# Patient Record
(demographics unavailable — no encounter records)

---

## 2020-01-17 NOTE — EDM.PDOC
ED HPI GENERAL MEDICAL PROBLEM





- General


Chief Complaint: Gastrointestinal Problem


Stated Complaint: VOMITING/CHILLS


Time Seen by Provider: 01/17/20 03:00


Source of Information: Reports: Patient


History Limitations: Reports: No Limitations





- History of Present Illness


INITIAL COMMENTS - FREE TEXT/NARRATIVE: 





34-year-old male presents to the ED with his wife with a similar type illness. 

He started to feel unwell evening before last while trying to do his workout at 

the gym he thought he just had no energy and started to feel unwell. Throughout 

yesterday he started to feel increasing nausea and when he came home last night 

he began vomiting bilious material. These had 3 major emesis is overnight every 

time he tries to put anything in it comes right back up. He's developed 

growling and increased cramping in his abdomen but no diarrhea. At some fever 

and some chills. His wife seemed to be suffering from acute viral 

gastroenteritis versus foodborne illness. He's feeling weak and slightly dizzy 

with standing. Denies any high fever or nasal congestion or cough. Perhaps very 

mild headache.


Onset: Gradual


Onset Date: 01/15/20 (I do feel a little unwell during his workout on the 

evening of the 15th and became worse yesterday.)


Duration: Hour(s):, Getting Worse


Location: Reports: Abdomen (Nausea and vomiting. No diarrhea)


Quality: Reports: Ache, Sharp (Mild intermittent abdominal cramping pain), 

Stabbing


Severity: Mild


Improves with: Reports: None


Worsens with: Reports: Other


Context: Reports: Sick Contact.  Denies: Activity, Exercise (Aching any fluids 

makes him vomit right away), Lifting, Trauma, Other (GM or through the 

workplace.)


Associated Symptoms: Reports: Fever/Chills, Loss of Appetite, Malaise, Nausea/

Vomiting, Other (No diarrhea).  Denies: No Other Symptoms, Confusion, Chest Pain

, Cough, cough w sputum, Diaphoresis, Headaches, Rash, Seizure, Shortness of 

Breath, Syncope (Bilious emesis.)


Treatments PTA: Reports: Other (see below) (Nothing will stay down)





- Related Data


 Allergies











Allergy/AdvReac Type Severity Reaction Status Date / Time


 


No Known Allergies Allergy   Verified 06/07/18 16:24











Home Meds: 


 Home Meds





FLUoxetine HCl [Prozac] 40 mg PO DAILY 20 Days  capsule 06/12/18 [Rx]











Past Medical History





- Past Health History


Medical/Surgical History: Denies Medical/Surgical History


Psychiatric History: Reports: Addiction (Previous problems with addiction to 

alcohol.), Depression





Social & Family History





- Family History


Family Medical History: Noncontributory





- Caffeine Use


Caffeine Use: Reports: None





- Living Situation & Occupation


Living situation: Reports: , Single


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 

catering business.)





ED ROS GENERAL





- Review of Systems


Review Of Systems: See Below


Constitutional: Reports: Fever, Chills, Malaise (Still mildly warm to palpation.

), Weakness, Fatigue, Decreased Appetite


HEENT: Reports: No Symptoms


Respiratory: Reports: No Symptoms


Cardiovascular: Reports: No Symptoms


Endocrine: Reports: Fatigue


GI/Abdominal: Reports: Nausea, Vomiting (Nausea if he tries to put anything in 

his stomach he vomits immediately.).  Denies: Abdominal Pain (Committed mild 

abdominal crampy pain. No diarrhea), Diarrhea ( Hematemesis)


: Reports: No Symptoms


Musculoskeletal: Reports: Muscle Pain


Skin: Reports: No Symptoms (Myalgia.)


Neurological: Reports: Dizziness (Mildly dizzy and lightheaded with standing.)


Psychiatric: Reports: No Symptoms


Hematologic/Lymphatic: Reports: No Symptoms


Immunologic: Reports: No Symptoms





ED EXAM, GI/ABD





- Physical Exam


Exam: See Below


Exam Limited By: No Limitations


General Appearance: Alert, WD/WN, No Apparent Distress, Other (Does feel 

slightly warm to palpation. Heart rate 100 respiratory to 18 BP 1/31/74 sats 

are 96%)


Eyes: Bilateral: Normal Appearance (No scleral icterus supraorbital pallor.)


Throat/Mouth: Other


Head: Atraumatic, Normocephalic (On is slightly dry and coated.)


Neck: Normal Inspection, Supple, Non-Tender, Full Range of Motion.  No: Limited 

Range of Motion, Lymphadenopathy (L)


Respiratory/Chest: No Respiratory Distress, Lungs Clear, No Accessory Muscle Use

, Chest Non-Tender


Cardiovascular: Normal Peripheral Pulses, Regular Rate, Rhythm, No Edema, No 

Murmur, No Rub


GI/Abdominal Exam: Soft, Non-Tender, No Organomegaly, No Abnormal Bruit, No Mass

, Pelvis Stable (Sounds are mildly hyperactive in all 4 quadrants.), Abnormal 

Bowel Sounds.  No: Guarding, Rigid, Rebound


 (Male) Exam: No Hernia


Back Exam: Normal Inspection, Full Range of Motion.  No: CVA Tenderness (L)


Extremities: Normal Inspection, Normal Range of Motion, Non-Tender


Neurological: Alert, Oriented, CN II-XII Intact, Normal Cognition


Psychiatric: Normal Affect, Normal Mood


Skin Exam: Warm, Dry, Intact, Normal Color, No Rash





Course





- Vital Signs


Last Recorded V/S: 


 Last Vital Signs











Temp      


 


Pulse  100   01/17/20 02:55


 


Resp  18   01/17/20 02:55


 


BP  131/74   01/17/20 02:55


 


Pulse Ox  95   01/17/20 02:55














- Orders/Labs/Meds


Orders: 


 Active Orders 24 hr











 Category Date Time Status


 


 Dextrose 5%-Lactated Ringers 1,000 ml Med  01/17/20 03:00 Active





 IV ASDIRECTED   








 Medication Orders





Dextrose/Lactated Ringer's (Dextrose 5%-Lactated Ringers)  1,000 mls @ 999 mls/

hr IV ASDIRECTED ESTEFANY


   Last Admin: 01/17/20 03:11  Dose: 999 mls/hr








Meds: 


Medications











Generic Name Dose Route Start Last Admin





  Trade Name Freq  PRN Reason Stop Dose Admin


 


Dextrose/Lactated Ringer's  1,000 mls @ 999 mls/hr  01/17/20 03:00  01/17/20 03:

11





  Dextrose 5%-Lactated Ringers  IV   999 mls/hr





  ASDIRECTED ESTEFANY   Administration





     





     





     





     














Discontinued Medications














Generic Name Dose Route Start Last Admin





  Trade Name Freq  PRN Reason Stop Dose Admin


 


Ondansetron HCl  4 mg  01/17/20 03:00  01/17/20 03:10





  Zofran  IVPUSH  01/17/20 03:01  4 mg





  ONETIME ONE   Administration





     





     





     





     














- Radiology Interpretation


Free Text/Narrative:: 


34-year-old male presents to the ED with acute onset of vomiting without 

diarrhea. His wife is also here with similar type illness although she has 

vomiting and diarrhea. Appears that his symptoms started night before last with 

nonspecific nausea and weakness. Intermittent chills low-grade fever and 

headache. He has no other signs or symptoms of influenza. Plan IV D5 Ringer's 

lactate at open. Zofran 4 mg IV to arrest vomiting.








- Re-Assessments/Exams


Free Text/Narrative Re-Assessment/Exam: 





01/17/20 04:00 patient reports feels much improved. He has about 15 minutes 

left of IV fluids to infuse. Discharged to home on Zofran 4 mg sublingual every 

4-6 hours as needed for nausea vomiting relief. Clear fluid diet. Avoid dairy 

products until he knows fissure is not going to develop developed diarrhea in 

the next 12 hours.








Departure





- Departure


Time of Disposition: 04:10


Disposition: Home, Self-Care 01


Condition: Fair


Clinical Impression: 


 Viral gastritis, Nausea and vomiting in adult patient, Gastroenteritis








- Discharge Information


*PRESCRIPTION DRUG MONITORING PROGRAM REVIEWED*: Not Applicable


*COPY OF PRESCRIPTION DRUG MONITORING REPORT IN PATIENT EVARISTO: Not Applicable


Instructions:  Nausea and Vomiting, Adult


Referrals: 


Elvis Comer MD [Primary Care Provider] - 


Forms:  ED Department Discharge, ED Return to Work/School Form


Additional Instructions: 


Evaluation in the emergency room this morning in regards to acute onset of 

nausea and vomiting and no diarrhea at this time. You're treated with 

intravenous fluids D5 Ringer's lactate to provide rehydration. His you're given 

Reglan 7.5 mg IV to arrest vomiting. Treatment at home is clear fluids such as 

Gatorade/Powerade. Ideally 5-6 ounces sipped per hour as this is very similar 

to IV fluids. When hungry try soda crackers first. If tolerated may have Jell-O 

at any time. Suggest and some jam on white bread. If tolerated may advance to 

soup such as turkey rice/chicken noodle etc. Suggesting way from all dairy 

products and will produce a grape juice for the next 12 hours until you know 

for sure you not going to develop diarrhea. The nausea and vomiting part of 

this illness usually last close to 16 hours. Zofran 4 mg under the tongue every 

4-6 hours necessary for further reduction of any nausea or vomiting. Return to 

the ED if you continue to vomit in spite of medication.





Sepsis Event Note





- Evaluation


Sepsis Screening Result: No Definite Risk





- Focused Exam


Vital Signs: 


 Vital Signs











  Pulse Resp BP Pulse Ox


 


 01/17/20 02:55  100  18  131/74  95











Date Exam was Performed: 01/17/20


Time Exam was Performed: 04:00





- My Orders


Last 24 Hours: 


My Active Orders





01/17/20 03:00


Dextrose 5%-Lactated Ringers 1,000 ml IV ASDIRECTED 














- Assessment/Plan


Last 24 Hours: 


My Active Orders





01/17/20 03:00


Dextrose 5%-Lactated Ringers 1,000 ml IV ASDIRECTED